# Patient Record
Sex: MALE | HISPANIC OR LATINO | URBAN - METROPOLITAN AREA
[De-identification: names, ages, dates, MRNs, and addresses within clinical notes are randomized per-mention and may not be internally consistent; named-entity substitution may affect disease eponyms.]

---

## 2024-06-19 ENCOUNTER — OFFICE VISIT (OUTPATIENT)
Dept: DENTISTRY | Facility: CLINIC | Age: 34
End: 2024-06-19

## 2024-06-19 VITALS — HEART RATE: 52 BPM | DIASTOLIC BLOOD PRESSURE: 93 MMHG | SYSTOLIC BLOOD PRESSURE: 147 MMHG

## 2024-06-19 DIAGNOSIS — Z01.20 ENCOUNTER FOR DENTAL EXAMINATION: Primary | ICD-10-CM

## 2024-06-19 PROCEDURE — D0274 BITEWINGS - 4 RADIOGRAPHIC IMAGES: HCPCS

## 2024-06-19 PROCEDURE — D0150 COMPREHENSIVE ORAL EVALUATION - NEW OR ESTABLISHED PATIENT: HCPCS

## 2024-06-23 NOTE — DENTAL PROCEDURE DETAILS
Comprehensive Exam    Oliverio Bedolla Rosales Fabian 33 y.o. male presents with self to [facility] for comprehensive exam. Pt presented for limited exam back in September 2023, has since had #1 and #16 extracted.  PMH reviewed, no changes, ASA I/II. Significant medical history: pt denies. Significant allergies: none known. Significant medications: pt denies.  Pain level 0/10    Chief complaint:   Wants a cleaning and caries examination.    Consent:  Reviewed procedures involved with comprehensive exam including radiographs, oral exam, and periodontal probing.   Patient understands and consents.    Radiographs: 4 BWs    Periodontal exam:  Hygiene - Poor  Plaque - Severe  Horizontal bone loss  UR - Moderate (15-33%)  UL - Moderate (15-33%)  LL - Moderate (15-33%)  LR - Moderate (15-33%)  Vertical bone loss - posterior region  Subgingival calculus - Generalized  BOP - Generalized  Mobility - None  Furcation involvements - None  Occlusal trauma - None  Smoker - No  Diabetic - No  Periodontal Stage: II  Periodontal Grade: B  Periodontal Plan: SRP UL, UR, LL, and LR    Caries exam:   #3 MO, #5 D (incipient), #10 L, #15 M (incipient), #19 D (incipient) and O, #20 DO, #29 O, #31 O  Teeth with high chance of needing RCT? #18 somewhat questionable radiographically, but clinical examination is inconclusive .    Oral cancer screening: No abnormalities detected  Soft tissues exam: Within normal limits  Hard tissues exam: Within normal limits    Other observations:  #18 appeared to have previous O composite which has dislodged. Pt states it is sensitive to cold and sweet. Explorer does not feel stick that is usually felt with caries. Exaggerated but non lingering response to cold test.    Tx plan:  Preliminary tx plan able to be determined at comp exam.  Pt will require SRP for all 4 quadrants based on periodontal examination.  Resins, starting with #18 which has some sensitivity to cold and sweet. Pt was advised need for RCT did not seem  likely from clinical examination, but radiographically it may be somewhat questionable.  Reevaluation 3 months after SRP. Pt was advised it is possible for calculus or stains to sometimes mask caries, and therefore it is possible to find additional caries.    NV: SRP one side.  NNV: SRP other side.  NNNV: resins, starting with #18 which has some sensitivity.    Attending: Dr. Moseley and Dr. Thompson were present in clinic.

## 2024-10-17 ENCOUNTER — OFFICE VISIT (OUTPATIENT)
Dept: DENTISTRY | Facility: CLINIC | Age: 34
End: 2024-10-17

## 2024-10-17 VITALS — HEART RATE: 63 BPM | SYSTOLIC BLOOD PRESSURE: 136 MMHG | DIASTOLIC BLOOD PRESSURE: 87 MMHG

## 2024-10-17 DIAGNOSIS — K05.6 PERIODONTAL DISEASE: Primary | ICD-10-CM

## 2024-10-17 PROCEDURE — D4342 PERIODONTAL SCALING AND ROOT PLANING - 1 TO 3 TEETH PER QUADRANT: HCPCS

## 2024-10-17 NOTE — DENTAL PROCEDURE DETAILS
SCALE AND RP UR     I Pad translation #[id number] [place]  Local anesthesia administered by Lacie Guillermo R[facility]    2 carpule/s given - 4% Septocaine 1:100K epi infiltrations  CHIEF CONCERN: none   PAIN SCALE: 0  ASA CLASS: ASA 1 - Normal health patient  PLAQUE: moderate  CALCULUS: Generalized  Heavy  BLEEDING: heavy  STAIN : Generalized  Heavy      Hand scaled and used cavitron    Oral Hygiene Instruction:  recommended brushing 2 x daily for 2 minutes MIN, recommended flossing daily, reviewed dietary precautions. Post op sc/rp instructions placed in AVS, printed and handed/ reviewed with patient.     Soft tissue exam:  soft tissue exam was normal  ExtraOral exam:   Extraoral exam was normal    REFERRALS: no referrals provided         NEXT VISIT:   --->sc/rp LR

## 2024-10-18 ENCOUNTER — OFFICE VISIT (OUTPATIENT)
Dept: DENTISTRY | Facility: CLINIC | Age: 34
End: 2024-10-18

## 2024-10-18 VITALS — DIASTOLIC BLOOD PRESSURE: 86 MMHG | HEART RATE: 67 BPM | SYSTOLIC BLOOD PRESSURE: 127 MMHG

## 2024-10-18 DIAGNOSIS — K05.6 PERIODONTAL DISEASE: Primary | ICD-10-CM

## 2024-10-18 PROCEDURE — D4341 PERIODONTAL SCALING AND ROOT PLANING - 4 OR MORE TEETH PER QUADRANT: HCPCS

## 2024-10-18 NOTE — DENTAL PROCEDURE DETAILS
SCALE AND RP LR   Local anesthesia administered by Lacie Guillermo,PHDH    2 carpule/s given - 4% Septocaine 1:100K epi infiltrations  CHIEF CONCERN: none   PAIN SCALE: 0  ASA CLASS: ASA 1 - Normal health patient  PLAQUE: moderate  CALCULUS: Generalized  Heavy  BLEEDING: heavy  STAIN : Generalized  Heavy    Hand scaled and used cavitron    Oral Hygiene Instruction:  recommended brushing 2 x daily for 2 minutes MIN, recommended flossing daily, reviewed dietary precautions. Post op sc/rp instructions placed in AVS, printed and handed/ reviewed with patient.     Soft tissue exam:  soft tissue exam was normal  ExtraOral exam:   Extraoral exam was normal    REFERRALS: no referrals provided         NEXT VISIT:   --->SRP UL  NV2: LL SRP

## 2024-11-05 ENCOUNTER — OFFICE VISIT (OUTPATIENT)
Dept: DENTISTRY | Facility: CLINIC | Age: 34
End: 2024-11-05

## 2024-11-05 VITALS — SYSTOLIC BLOOD PRESSURE: 131 MMHG | DIASTOLIC BLOOD PRESSURE: 88 MMHG | HEART RATE: 62 BPM

## 2024-11-05 DIAGNOSIS — K05.6 PERIODONTAL DISEASE: Primary | ICD-10-CM

## 2024-11-05 PROCEDURE — D4341 PERIODONTAL SCALING AND ROOT PLANING - 4 OR MORE TEETH PER QUADRANT: HCPCS

## 2024-11-05 NOTE — DENTAL PROCEDURE DETAILS
SCALE AND RP LR   Local anesthesia administered by     2 carpule/s given - 20% Bencocaine topical used and 4% Septocaine 1:100K epi infiltrations  CHIEF CONCERN: none   PAIN SCALE: 0  ASA CLASS: ASA 1 - Normal health patient  PLAQUE: moderate  CALCULUS: Generalized  Heavy  BLEEDING: heavy  STAIN : Generalized  Moderate      Hand scaled and used cavitron    Oral Hygiene Instruction:  recommended brushing 2 x daily for 2 minutes MIN, recommended flossing daily, reviewed dietary precautions. Post op sc/rp instructions placed in AVS, printed and handed/ reviewed with patient.     Soft tissue exam:  soft tissue exam was normal  ExtraOral exam:   Extraoral exam was normal    REFERRALS: no referrals provided         NEXT VISIT:   --->sc/rp UL

## 2024-11-19 ENCOUNTER — OFFICE VISIT (OUTPATIENT)
Dept: INTERNAL MEDICINE CLINIC | Facility: CLINIC | Age: 34
End: 2024-11-19

## 2024-11-19 VITALS
HEART RATE: 64 BPM | SYSTOLIC BLOOD PRESSURE: 122 MMHG | OXYGEN SATURATION: 99 % | HEIGHT: 70 IN | DIASTOLIC BLOOD PRESSURE: 91 MMHG | WEIGHT: 199 LBS | BODY MASS INDEX: 28.49 KG/M2 | TEMPERATURE: 98.5 F

## 2024-11-19 DIAGNOSIS — Z13.9 SCREENING DUE: ICD-10-CM

## 2024-11-19 DIAGNOSIS — R21 RASH: Primary | ICD-10-CM

## 2024-11-19 PROCEDURE — 99214 OFFICE O/P EST MOD 30 MIN: CPT | Performed by: STUDENT IN AN ORGANIZED HEALTH CARE EDUCATION/TRAINING PROGRAM

## 2024-11-19 RX ORDER — KETOCONAZOLE 20 MG/G
CREAM TOPICAL DAILY
Qty: 15 G | Refills: 1 | Status: SHIPPED | OUTPATIENT
Start: 2024-11-19

## 2024-11-19 NOTE — PATIENT INSTRUCTIONS
Please go for your additional blood work  Please start taking the Ketoconazole cream and apply to your back to the affected region  Please return in 6-7 weeks to follow-up for your annual physical

## 2024-11-19 NOTE — ASSESSMENT & PLAN NOTE
Patient reports history of acute on high-dose exacerbated setting of stress  Also endorses sensitivity to increased temperature water on her initial onset  During this time, symptoms have since improved with use of topical moisturizer  On physical exam, patient does exhibit increased length of the lower lumbar back    Based on overall presentation, differential for patient's pruritus includes idiopathic urticaria in the setting of increased stress and temperature sensitivity supportive as well with resolution without active intervention higher than moisturizing cream.  Can also consider possible atopic sensitivity given previous use of new body moisturizer or tinea corporis given scaling seen on lower lumbar back in the setting patient recently going to the gym and using exercise benches without proper hygiene measures with use.     Plan  Will order routine lab work including CBC, CMP, TSH  Will start topical ketoconazole for suspicion of localized tinea corporis to the lumbar region  Patient advised to monitor for increased pruritus and start over-the-counter Pepcid or hydrocortisone cream if symptoms continue to worsen  Patient additionally advised to avoid the previous biting stressor as well as any additional scented fragrances or perfume products  Patient advised to follow-up in 6 weeks for annual physical as well as reassessment of pruritus  Orders:    CBC and differential; Future    Comprehensive metabolic panel; Future    TSH + Free T4; Future    ketoconazole (NIZORAL) 2 % cream; Apply topically daily

## 2024-11-19 NOTE — PROGRESS NOTES
Name: Oliverio Hubbard      : 1990      MRN: [id number]  Encounter Provider: Ruben Acevedo MD  Encounter Date: 2024   Encounter department: [facility] [place]  :  Assessment & Plan  Screening due    Orders:    CBC and differential; Future    Comprehensive metabolic panel; Future    HIV 1/2 AG/AB w Reflex [facility] for 2 yr old and above; Future    Hepatitis panel, acute; Future    Urticarial pruritus  Patient reports history of acute on high-dose exacerbated setting of stress  Also endorses sensitivity to increased temperature water on her initial onset  During this time, symptoms have since improved with use of topical moisturizer  On physical exam, patient does exhibit increased length of the lower lumbar back    Based on overall presentation, differential for patient's pruritus includes idiopathic urticaria in the setting of increased stress and temperature sensitivity supportive as well with resolution without active intervention higher than moisturizing cream.  Can also consider possible atopic sensitivity given previous use of new body moisturizer or tinea corporis given scaling seen on lower lumbar back in the setting patient recently going to the gym and using exercise benches without proper hygiene measures with use.     Plan  Will order routine lab work including CBC, CMP, TSH  Will start topical ketoconazole for suspicion of localized tinea corporis to the lumbar region  Patient advised to monitor for increased pruritus and start over-the-counter Pepcid or hydrocortisone cream if symptoms continue to worsen  Patient additionally advised to avoid the previous biting stressor as well as any additional scented fragrances or perfume products  Patient advised to follow-up in 6 weeks for annual physical as well as reassessment of pruritus  Orders:    CBC and differential; Future    Comprehensive metabolic panel; Future    TSH + Free T4; Future    ketoconazole  (NIZORAL) 2 % cream; Apply topically daily          Depression Screening and Follow-up Plan: Patient was screened for depression during today's encounter. They screened negative with a PHQ-2 score of 0.      History of Present Illness     Patient is medical history presenting for 1 week history of pruritus. Patient is German speaking and was spoken to using a German  (ID: [id number]). Patient reports that he had an acute onset of pruritus 1 week ago initially on his right thigh that began suddenly with no inciting factor. States that it was associated with localized sharp pain that was sensitive to touch and hot water. He did not notice any obvious skin color changes or morphological changes other than superficial scratch marks from the pruritus. He states that during this 1 week. No pruritus progressed to include his lower lumbar back as well as left lateral thigh, during which the pain had resolved but the pruritus had persisted, but is now improved from initially. Patient reports that he currently lives with wife at home, and that he has not had similar symptoms to this. States that the week prior to symptom onset, he began using a new body moisturizer to his arms and legs, but is unsure of the brand name or ingredients. States that he does not use scented fragrances or perfume products, and uses Dove brand products for shampoo and soap. Patient reports that the pruritus had been exacerbated during periods of stress. Patient reports that he believes that it is possibly associated with going to the gym for the past 1 week due to sitting on the exercise benches.  He denies any fevers, chills, chest pain, shortness of breath, abdominal pain, nausea/vomiting during this time.  He additionally denies any known allergies as well as specific dietary products.        Review of Systems   Constitutional:  Negative for chills and fever.   HENT:  Negative for ear pain and sore throat.    Eyes:  Negative for  "pain and visual disturbance.   Respiratory:  Negative for cough and shortness of breath.    Cardiovascular:  Negative for chest pain and palpitations.   Gastrointestinal:  Negative for abdominal pain and vomiting.   Genitourinary:  Negative for dysuria and hematuria.   Musculoskeletal:  Negative for arthralgias and back pain.   Skin:  Negative for color change and rash.   Neurological:  Negative for seizures and syncope.   All other systems reviewed and are negative.    Social History     Tobacco Use    Smoking status: Never    Smokeless tobacco: Never   Vaping Use    Vaping status: Never Used   Substance and Sexual Activity    Alcohol use: Never    Drug use: Never    Sexual activity: Not on file        Objective   /91 (BP Location: Left arm, Patient Position: Sitting, Cuff Size: Large)   Pulse 64   Temp 98.5 °F (36.9 °C) (Temporal)   Ht 5' 10\" (1.778 m)   Wt 90.3 kg (199 lb)   SpO2 99%   BMI 28.55 kg/m²      Physical Exam  Vitals reviewed.   Constitutional:       General: He is not in acute distress.     Appearance: Normal appearance.   HENT:      Head: Normocephalic and atraumatic.      Mouth/Throat:      Mouth: Mucous membranes are moist.      Pharynx: Oropharynx is clear. No oropharyngeal exudate.   Eyes:      Extraocular Movements: Extraocular movements intact.      Pupils: Pupils are equal, round, and reactive to light.   Cardiovascular:      Rate and Rhythm: Normal rate and regular rhythm.      Pulses: Normal pulses.      Heart sounds: Normal heart sounds. No murmur heard.  Pulmonary:      Effort: Pulmonary effort is normal. No respiratory distress.      Breath sounds: Normal breath sounds. No wheezing or rales.   Abdominal:      General: Abdomen is flat. There is no distension.      Palpations: Abdomen is soft.      Tenderness: There is no abdominal tenderness.   Musculoskeletal:         General: No deformity. Normal range of motion.      Cervical back: Neck supple. No tenderness.      Right " lower leg: No edema.      Left lower leg: No edema.   Skin:     General: Skin is warm and dry.      Capillary Refill: Capillary refill takes less than 2 seconds.      Coloration: Skin is ashen (Lumbar back associated with scaling and xerosis). Skin is not mottled or pale.      Findings: Rash and wound (Patient has chronic scarring on the left lateral thigh from childhood) present. Rash is scaling (Localized to lumbar back). Rash is not papular, purpuric or pustular.      Comments: Utilization of the left and right lateral thigh reveal no sharp demarcation or skin color changes; small dry scabbing noted from previous excoriations    Please refer to media tab for pictures   Neurological:      General: No focal deficit present.      Mental Status: He is alert and oriented to person, place, and time.       Administrative Statements   I have spent a total time of 45 minutes in caring for this patient on the day of the visit/encounter including Prognosis, Risks and benefits of tx options, Instructions for management, Patient and family education, Importance of tx compliance, Risk factor reductions, Impressions, Counseling / Coordination of care, Documenting in the medical record, Reviewing / ordering tests, medicine, procedures  , Obtaining or reviewing history  , and Communicating with other healthcare professionals .

## 2024-11-19 NOTE — ASSESSMENT & PLAN NOTE
Orders:    CBC and differential; Future    Comprehensive metabolic panel; Future    HIV 1/2 AG/AB w Reflex [facility] for 2 yr old and above; Future    Hepatitis panel, acute; Future